# Patient Record
Sex: FEMALE | Race: WHITE
[De-identification: names, ages, dates, MRNs, and addresses within clinical notes are randomized per-mention and may not be internally consistent; named-entity substitution may affect disease eponyms.]

---

## 2018-11-10 ENCOUNTER — HOSPITAL ENCOUNTER (EMERGENCY)
Dept: HOSPITAL 62 - ER | Age: 33
Discharge: HOME | End: 2018-11-10
Payer: COMMERCIAL

## 2018-11-10 VITALS — DIASTOLIC BLOOD PRESSURE: 81 MMHG | SYSTOLIC BLOOD PRESSURE: 113 MMHG

## 2018-11-10 DIAGNOSIS — Z88.6: ICD-10-CM

## 2018-11-10 DIAGNOSIS — N85.00: Primary | ICD-10-CM

## 2018-11-10 DIAGNOSIS — R10.2: ICD-10-CM

## 2018-11-10 DIAGNOSIS — R10.30: ICD-10-CM

## 2018-11-10 DIAGNOSIS — Z97.5: ICD-10-CM

## 2018-11-10 DIAGNOSIS — Z88.0: ICD-10-CM

## 2018-11-10 DIAGNOSIS — N39.0: ICD-10-CM

## 2018-11-10 LAB
ADD MANUAL DIFF: NO
ALBUMIN SERPL-MCNC: 4.6 G/DL (ref 3.5–5)
ALP SERPL-CCNC: 62 U/L (ref 38–126)
ALT SERPL-CCNC: 38 U/L (ref 9–52)
ANION GAP SERPL CALC-SCNC: 14 MMOL/L (ref 5–19)
APPEARANCE UR: (no result)
APTT PPP: (no result) S
AST SERPL-CCNC: 29 U/L (ref 14–36)
BASOPHILS # BLD AUTO: 0 10^3/UL (ref 0–0.2)
BASOPHILS NFR BLD AUTO: 0.4 % (ref 0–2)
BILIRUB DIRECT SERPL-MCNC: 0.3 MG/DL (ref 0–0.4)
BILIRUB SERPL-MCNC: 0.5 MG/DL (ref 0.2–1.3)
BILIRUB UR QL STRIP: NEGATIVE
BUN SERPL-MCNC: 11 MG/DL (ref 7–20)
CALCIUM: 9.8 MG/DL (ref 8.4–10.2)
CHLAM PCR: NOT DETECTED
CHLORIDE SERPL-SCNC: 103 MMOL/L (ref 98–107)
CO2 SERPL-SCNC: 24 MMOL/L (ref 22–30)
EOSINOPHIL # BLD AUTO: 0.3 10^3/UL (ref 0–0.6)
EOSINOPHIL NFR BLD AUTO: 2.9 % (ref 0–6)
ERYTHROCYTE [DISTWIDTH] IN BLOOD BY AUTOMATED COUNT: 14.4 % (ref 11.5–14)
GLUCOSE SERPL-MCNC: 98 MG/DL (ref 75–110)
GLUCOSE UR STRIP-MCNC: NEGATIVE MG/DL
GON PCR: NOT DETECTED
HCT VFR BLD CALC: 39.4 % (ref 36–47)
HGB BLD-MCNC: 13.3 G/DL (ref 12–15.5)
KETONES UR STRIP-MCNC: NEGATIVE MG/DL
LIPASE SERPL-CCNC: 93.1 U/L (ref 23–300)
LYMPHOCYTES # BLD AUTO: 2.8 10^3/UL (ref 0.5–4.7)
LYMPHOCYTES NFR BLD AUTO: 27.2 % (ref 13–45)
MCH RBC QN AUTO: 27.9 PG (ref 27–33.4)
MCHC RBC AUTO-ENTMCNC: 33.7 G/DL (ref 32–36)
MCV RBC AUTO: 83 FL (ref 80–97)
MONOCYTES # BLD AUTO: 0.8 10^3/UL (ref 0.1–1.4)
MONOCYTES NFR BLD AUTO: 8.4 % (ref 3–13)
NEUTROPHILS # BLD AUTO: 6.2 10^3/UL (ref 1.7–8.2)
NEUTS SEG NFR BLD AUTO: 61.1 % (ref 42–78)
NITRITE UR QL STRIP: NEGATIVE
PH UR STRIP: 5 [PH] (ref 5–9)
PLATELET # BLD: 301 10^3/UL (ref 150–450)
POTASSIUM SERPL-SCNC: 4.8 MMOL/L (ref 3.6–5)
PROT SERPL-MCNC: 7.8 G/DL (ref 6.3–8.2)
PROT UR STRIP-MCNC: 100 MG/DL
RBC # BLD AUTO: 4.76 10^6/UL (ref 3.72–5.28)
RBCS (WET MOUNT): (no result)
SODIUM SERPL-SCNC: 141 MMOL/L (ref 137–145)
SP GR UR STRIP: 1.02
T.VAGINALIS (WET MOUNT): (no result)
TOTAL CELLS COUNTED % (AUTO): 100 %
UROBILINOGEN UR-MCNC: NEGATIVE MG/DL (ref ?–2)
WBC # BLD AUTO: 10.1 10^3/UL (ref 4–10.5)
WBCS (WET MOUNT): (no result)
YEAST (WET MOUNT): (no result)

## 2018-11-10 PROCEDURE — 83690 ASSAY OF LIPASE: CPT

## 2018-11-10 PROCEDURE — 81001 URINALYSIS AUTO W/SCOPE: CPT

## 2018-11-10 PROCEDURE — 93976 VASCULAR STUDY: CPT

## 2018-11-10 PROCEDURE — 87491 CHLMYD TRACH DNA AMP PROBE: CPT

## 2018-11-10 PROCEDURE — 87086 URINE CULTURE/COLONY COUNT: CPT

## 2018-11-10 PROCEDURE — 36415 COLL VENOUS BLD VENIPUNCTURE: CPT

## 2018-11-10 PROCEDURE — 87591 N.GONORRHOEAE DNA AMP PROB: CPT

## 2018-11-10 PROCEDURE — 76380 CAT SCAN FOLLOW-UP STUDY: CPT

## 2018-11-10 PROCEDURE — 87210 SMEAR WET MOUNT SALINE/INK: CPT

## 2018-11-10 PROCEDURE — 96365 THER/PROPH/DIAG IV INF INIT: CPT

## 2018-11-10 PROCEDURE — 96375 TX/PRO/DX INJ NEW DRUG ADDON: CPT

## 2018-11-10 PROCEDURE — 76856 US EXAM PELVIC COMPLETE: CPT

## 2018-11-10 PROCEDURE — 85025 COMPLETE CBC W/AUTO DIFF WBC: CPT

## 2018-11-10 PROCEDURE — 99284 EMERGENCY DEPT VISIT MOD MDM: CPT

## 2018-11-10 PROCEDURE — 84703 CHORIONIC GONADOTROPIN ASSAY: CPT

## 2018-11-10 PROCEDURE — 80053 COMPREHEN METABOLIC PANEL: CPT

## 2018-11-10 NOTE — ER DOCUMENT REPORT
ED GI/





- General


Chief Complaint: Abdominal Cramping


Stated Complaint: ABDOMINAL PAIN/VAGINAL BLEEDING


Time Seen by Provider: 11/10/18 10:40


Notes: 





Patient is complaining of pain in the lower abdomen/pelvic region that began 

about 5 AM this morning.  She feels as if it is "razor blades" sticking in her 

lower abdomen.  Patient is being seen by a local GYN for vaginal bleeding that 

has been heavy ever since she had her son 2 years ago.  Beginning on 

, of this year, patient says that her bleeding has become daily, every day.  

She has had an office ultrasound in mid October, which showed a left ovarian 

cyst and "hyperplasia and thickened endometrium".  She has had Pap smears and 

colposcopy performed and is scheduled for D&C .  She will have an 

IUD placed a couple of weeks later to provide hormone therapy.  Patient has had 

her tubes tied and had IUDs in the past.  She has had 3 C-sections, but no 

other abdominal surgeries.





Patient does have anxiety for which she takes Xanax as needed, but has not 

taken any recently.








TRAVEL OUTSIDE OF THE [place] IN LAST 30 DAYS: No





- Related Data


Allergies/Adverse Reactions: 


 





acetaminophen [From Percocet] Allergy (Verified 11/10/18 10:56)


 


butalbital [From Fioricet] Allergy (Verified 11/10/18 10:56)


 


caffeine [From Fioricet] Allergy (Verified 11/10/18 10:56)


 


nortriptyline [Nortriptyline] Allergy (Verified 11/10/18 10:56)


 


oxycodone HCl [From Percocet] Allergy (Verified 11/10/18 10:56)


 


Penicillins Allergy (Verified 11/10/18 10:56)


 











Past Medical History





- Social History


Smoking Status: Unknown if Ever Smoked


Family History: Reviewed & Not Pertinent


Endocrine Medical History: Reports: Hx Diabetes Mellitus Type 2 - gestational


Past Surgical History: Reports: Hx Breast Surgery - reduction, Hx  

Section





- Immunizations


Hx Diphtheria, Pertussis, Tetanus Vaccination: Yes - received with [facility]





Review of Systems





- Review of Systems


Notes: 





REVIEW OF SYSTEMS:





CONSTITUTIONAL :  Denies fever.


  


EENT:   Denies eye, ear, nose or mouth or throat pain or other symptoms.





CARDIOVASCULAR:  Denies chest pain.





RESPIRATORY:  Denies cough, chest congestion, or shortness of breath.





GASTROINTESTINAL: See HPI.





GENITOURINARY:  Denies difficulty or painful urinating, urinary frequency, 

blood in urine.





MUSCULOSKELETAL:  Denies back or neck pain.  Denies joint pain or swelling.





SKIN:   Denies rash or skin lesions.





NEUROLOGICAL:  Denies LOC or altered mental status.  Denies headache.  Denies 

sensory loss or motor deficits.





ALL OTHER SYSTEMS REVIEWED AND NEGATIVE.





Physical Exam





- Vital signs


Vitals: 


 











Temp Pulse Resp BP Pulse Ox


 


 98.2 F   85   20   151/88 H  97 


 


 11/10/18 10:31  11/10/18 10:31  11/10/18 10:31  11/10/18 10:31  11/10/18 10:31











Interpretation: Normal





- Notes


Notes: 





PHYSICAL EXAMINATION:





GENERAL: Well-appearing, in no acute distress.





HEAD: Atraumatic, normocephalic.





EYES: Pupils equal round and reactive to light, extraocular movements intact.





NECK: Normal range of motion, supple.





LUNGS: Breath sounds clear and equal bilaterally.





HEART: Regular rate and rhythm without murmurs.





ABDOMEN: Soft, mildly tender in the suprapubic region.  No guarding or rebound.

  No masses.





BACK: Mild paralumbar tenderness in the lower back, but otherwise no tenderness 

throughout remainder of the back.





EXTREMITIES: Normal range of motion without pain.





NEUROLOGICAL:  Normal speech, normal gait.  Normal sensory, motor, and reflex 

exams.  Awake, alert, and oriented x3.  Cranial nerves normal.





PSYCH: Normal mood, normal affect.





SKIN: Warm, dry, no rashes.





- Genitourinary


External exam: Normal


Speculum exam: Cervix closed.  No: Vaginal discharge


Vaginal bleeding: Mild - No clots seen


Bimanuel exam: Uterus enlarged - Somewhat enlarged and very tender to press..  

No: Cervical motion tender, Bladder/Urethral tender, Adnexal mass, Adnexal 

tenderness





Course





- Re-evaluation


Re-evalutation: 





11/10/18 11:47


Patient says she got a "little bit" of pain relief with the Toradol.  We will 

give her some fentanyl IV.  Have ordered an ultrasound.





11/10/18 16:24


Patient requested additional pain medication and was given a milligram of 

Dilaudid IV.  That seemed to help her pain significantly.  Lab studies all came 

back essentially normal except for her urinalysis.  White count and 

differential normal.  I initially did an ultrasound which did not show any 

significant finding except for endometrial hyperplasia and collection of 

heterogeneous material in the uterus.  Patient's urine looked like a possible 

UTI with many WBCs and RBCs on the urinalysis.  The blood could be secondary to 

the patient's continuous vaginal bleeding that she has had for the last several 

months.  However she had a significant number of white cells.  A urine culture 

was ordered, the patient was given Rocephin 1 g IV, and she will be discharged 

on a 6-day prescription for Macrobid.





I spoke with Dr. Walden, on-call for OB/GYN, asked him if there is anything else 

that he would recommend me doing at this time and he had no other 

recommendations and felt the patient had been sufficiently worked up and could 

be discharged home.





Patient was advised to return if she has any new or worsening symptoms.








- Vital Signs


Vital signs: 


 











Temp Pulse Resp BP Pulse Ox


 


 98.2 F   85   20   151/88 H  97 


 


 11/10/18 10:31  11/10/18 10:31  11/10/18 10:31  11/10/18 10:31  11/10/18 10:31














- Laboratory


Result Diagrams: 


 11/10/18 11:05





 11/10/18 11:05


Laboratory results interpreted by me: 


 











  11/10/18 11/10/18





  10:30 11:05


 


RDW   14.4 H


 


Urine Protein  100 H 


 


Urine Blood  LARGE H 














- Diagnostic Test


Radiology reviewed: Image reviewed, Reports reviewed - Ultrasound shows 

collection of heterogeneous material in the uterus.  CT scan showed the same.  

Otherwise, both of those studies were unremarkable.  There is no evidence of 

any kidney stone or hydronephrosis.  CT scan noncontrasted showed the same 

heterogeneous collection of material in the uterus but no other findings.





Discharge





- Discharge


Clinical Impression: 


 Pelvic pain, Abdominal pain, Endometrial hyperplasia, UTI (urinary tract 

infection)





Condition: Stable


Disposition: HOME, SELF-CARE


Additional Instructions: 


ABDOMINAL PAIN:





     There are many causes of abdominal pain.  Pain can mean a serious problem 

requiring surgery (such as appendicitis). It can also be an innocent problem 

that goes away on its own (such as a viral infection).  Often, time must pass 

to determine the cause of pain.


     The physician does not feel that hospitalization is necessary, at present. 

Things may change within the next 24 hours. Call the doctor or come back for re-

examination if any problems occur, such as:


     (1) Pain that becomes more severe, steady, or becomes concentrated in one 

specific area.  Also, pain that is more severe with movement or coughing.  


     (2) Vomiting that persists or becomes more frequent.  


     (3) Blood in the vomitus, urine, or bowel movements.  Blood in the stool 

may have a tarry or black appearance.


     (4) Shaking chills or fever greater than 100 degrees F. 


     (5) The abdomen becomes more distended or swollen. 


     (6) Bowel movements cease. 


     (7) Failure to improve as expected.








You have been found to have fluid and other materials in the your uterus that 

is likely the cause of your pain.  That is also the reason that you are 

scheduled to have the D&C done Wednesday.





TORADOL INJECTION:


     You have been given an injection of ketorolac tromethamine (Toradol).  

This is an excellent, safe drug for pain control.  It also has potent 

antiinflammatory action.  You should have significant pain relief within about 

one hour.


     Toradol is not addicting and is non-sedating.  It does not interfere with 

driving or work.


     Call or return if you develop itching, hives, shortness of breath, or rash.





PAIN MEDICATION INJECTION:


     You have received an injection of a pain medication.  You should 

experience significant pain relief within 45 minutes.  This drug is a narcotic -

- it will impair your judgement, slow your reaction time and make you sleepy (

as well as relieve your pain).  Narcotics also can cause nausea.


     You should not drive, work with machinery, or perform any task requiring 

mental alertness until all effects of the medication are gone -- six to eight 

hours.  Do not take any alcohol, or sedatives, and do not take any other 

medication without checking with your physician.








ORAL NARCOTIC MEDICATION:


     You have been given a prescription for pain control.  This medication is a 

narcotic.  It's best taken with food, as nausea can result if taken on an empty 

stomach.


     Don't operate machinery or drive within six hours of taking this 

medication.  Do not combine this medicine with alcohol, or with any medication 

which can cause sedation (such as cold tablets or sleeping pills) unless you 

get permission from the physician.


     Narcotics tend to cause constipation.  If possible, drink plenty of fluids 

and eat a diet high in fiber and fruits.








Likely URINARY TRACT INFECTION:


     Your evaluation indicates that you may have a urinary tract infection. 

This is due to germs growing in the bladder.  This is a common problem.


     This infection usually responds quickly to antibiotics.  Your antibiotic 

should be taken exactly as prescribed.  Drink plenty of fluids -- three to four 

quarts a day.


     Occasionally, a bladder anesthetic will be prescribed to help stop the 

feeling of urgency until the antibiotic has a chance to clear the infection.  

This may cause your urine to be dark orange.


     Certain urine infections require a culture.  If the doctor obtained a 

culture, the results will be back in two days.  You should call to see if a 

change in treatment is needed.


     A repeat urinalysis after you finish treatment is often recommended.  The 

physician will let you know if further testing is required.


     Call the doctor if you develop fever, chills, flank pain, inability to 

urinate, or blood in the urine.








ANTIBIOTIC THERAPY:


     You have been given an antibiotic prescription.  It's important that you 

take all the medication, unless instructed otherwise by your physician.  

Failure to complete the entire course can result in relapse of your condition.


     Common side effects of antibiotics include nausea, intestinal cramping, or 

diarrhea.  Women may develop vaginal yeast infections, and babies can get yeast 

(thrush) in the mouth following the use of antibiotics.  Contact your physician 

if you develop significant side effects from this medication.


     Allergy to this antibiotic can result in hives, wheezing, faintness, or 

itching.  If symptoms of allergy occur, stop the medication and call the doctor.











NITROFURANTOIN (MACRODANTIN, MACROBID):


     You have received a prescription for nitrofurantoin (Macrodantin). This 

antibiotic is used for urinary tract infections.





Women who are pregnant or nursing should notify the physician before taking 

this medicine.  If you have ever had a problem caused by this medication in the 

past, be sure the physician is aware of it.


     Common side effects of this medicine include nausea, vomiting, or 

decreased appetite.  Notify your physician if these side effects become severe.


     Immediately stop this medicine and call the physician if you develop cough

, shortness of breath, chest pain, weakness, jaundice (yellow color of the skin 

and whites of the eyes), or a skin rash.











FOLLOW-UP CARE:


If you have been referred to a physician for follow-up care, call the physician

s office for an appointment as you were instructed or within the next two days.

  If you experience worsening or a significant change in your symptoms, notify 

the physician immediately or return to the Emergency Department at any time for 

re-evaluation.








Keep your appointments this week to have your preop evaluation and then surgery 

as scheduled.





Return at any time if you having new or worsening symptoms such as fevers, 

increasing pain, etc.








Prescriptions: 


Hydrocodone/Acetaminophen [Norco 5-325 Tablet] 1 - 2 each PO Q6HP PRN #15 tablet


 PRN Reason: 


Nitrofurantoin/Nitrofuran Mac [Macrobid 100 mg Capsule] 1 tab PO BID #12 capsule


Referrals: 


MONTEZ VASQUEZ MD [ACTIVE STAFF] - Follow up as needed

## 2018-11-10 NOTE — RADIOLOGY REPORT (SQ)
EXAM DESCRIPTION:  CT LTD RENAL STONE PROTOCOL ON



COMPLETED DATE/TIME:  11/10/2018 2:01 pm



REASON FOR STUDY:  Suprapubic pain and left side pain and hematuria



COMPARISON:  Concurrent pelvic ultrasound



TECHNIQUE:  CT scan of the abdomen and pelvis performed without intravenous or oral contrast. Images 
reviewed with lung, soft tissue, and bone windows. Reconstructed coronal and sagittal MPR images revi
ewed. All images stored on PACS.

All CT scanners at this facility use dose modulation, iterative reconstruction, and/or weight based d
osing when appropriate to reduce radiation dose to as low as reasonably achievable (ALARA).

[facility]: Dose Right  [facility]: CareDose    [facility]: Dose Right    [facility]: Teradose 4D    [facility]: Smart Cognitive Code



RADIATION DOSE:  CT Rad equipment meets quality standard of care and radiation dose reduction techniq
ues were employed. CTDIvol: 11.1 mGy. DLP: 612 mGy-cm.mGy.



LIMITATIONS:  None.



FINDINGS:  LOWER CHEST: No significant findings. No nodules or infiltrates.

NON-CONTRASTED LIVER, SPLEEN, ADRENALS: Evaluation limited by lack of IV contrast.  The liver is diff
usely hypoattenuating.  No identified significant masses.

PANCREAS: No masses. No peripancreatic inflammatory changes.

GALLBLADDER: No identified stones by CT criteria. No inflammatory changes to suggest cholecystitis.

RIGHT KIDNEY AND URETER: No suspicious masses. Assessment limited by lack of IV contrast.   No signif
icant calcifications.   No hydronephrosis or hydroureter.

LEFT KIDNEY AND URETER: No suspicious masses. Assessment limited by lack of IV contrast.   No signifi
cant calcifications.   No hydronephrosis or hydroureter.

AORTA AND RETROPERITONEUM: No aneurysm. No retroperitoneal masses or adenopathy.

BOWEL AND PERITONEAL CAVITY: No dilated loops of bowel.  No obvious masses or inflammatory changes. N
o free fluid.  No intraperitoneal free air.

APPENDIX: Normal.

PELVIS, BLADDER, AND ABDOMINAL WALL:Endometrial cavity is distended with heterogeneous hyperattenuati
ng an hypoattenuating material, corresponding to same day pelvic ultrasound.  Bilateral adnexal hypod
ensities, located anteriorly within the pelvis measuring 2.6 x 3.6 cm on the left and 4.4 x 3.4 cm on
 the right.  These likely represent ovarian cysts.  Given the age of the patient and the size, no fur
ther follow-up is warranted.  No pelvic free fluid.  Urinary bladder is collapsed.

BONES: Mild degenerative disc disease of L4-S1.

OTHER: No other significant finding.



IMPRESSION:

1. No acute findings within the abdomen or pelvis.  Specifically, no nephrolithiasis or hydronephrosi
s.

2. Endometrial cavity the uterus is distended with heterogenous material.  Again, likely representing
 blood products, given the patient's age, and corresponding to same day pelvic ultrasound.  Recommend
ations remain the same.



COMMENT:  Quality ID # 436: Final reports with documentation of one or more dose reduction techniques
 (e.g., Automated exposure control, adjustment of the mA and/or kV according to patient size, use of 
iterative reconstruction technique)



TECHNICAL DOCUMENTATION:  JOB ID:  [id number]

 2011 [employer]- All Rights Reserved



Reading location - IP/workstation name: [email or web address]

## 2018-11-10 NOTE — RADIOLOGY REPORT (SQ)
EXAM DESCRIPTION:  U/S NON OB PEL W/DOPPLER



COMPLETED DATE/TIME:  11/10/2018 12:56 pm



REASON FOR STUDY:  Lower midline abdominal pain, Hx of ovarian cyst



COMPARISON:  2/20/2008



TECHNIQUE:  Dynamic and static grayscale images acquired of the pelvis via transabdominal approach an
d recorded on PACS. Additional selected color Doppler and spectral images recorded.



LIMITATIONS:  None.



FINDINGS:  UTERUS: Contour normal.  No mass.

ENDOMETRIAL STRIPE: Heterogenous

CERVIX: Few nabothian cysts.  Heterogenous material within the cervical canal.

RIGHT OVARY AND DOPPLER: Ovary not visualized.

LEFT OVARY AND DOPPLER: Ovary not visualized.

FREE FLUID: None noted.

OTHER: No other significant finding.

MEASUREMENTS:

UTERUS: 11.9 x 5.1 x 6.4 cm

ENDOMETRIAL STRIPE: 2.2 cm

RIGHT OVARY: Not visualized.

LEFT OVARY: Not visualized



IMPRESSION:

1. Nonvisualization of the ovaries.

2. Heterogenous material within the endometrium extending into the cervical canal which likely repres
ents blood products giving the patient's age.  However, other etiologies remain in the differential. 
 Correlate with physical exam and tissue sampling.



TECHNICAL DOCUMENTATION:  JOB ID:  [id number]

 2011 [employer]- All Rights Reserved                           Rev-5/18



Reading location - IP/workstation name: [email or web address]

## 2018-11-13 LAB
APPEARANCE UR: (no result)
APTT PPP: YELLOW S
BILIRUB UR QL STRIP: NEGATIVE
ERYTHROCYTE [DISTWIDTH] IN BLOOD BY AUTOMATED COUNT: 14.2 % (ref 11.5–14)
GLUCOSE UR STRIP-MCNC: NEGATIVE MG/DL
HCT VFR BLD CALC: 35.9 % (ref 36–47)
HGB BLD-MCNC: 12.3 G/DL (ref 12–15.5)
KETONES UR STRIP-MCNC: NEGATIVE MG/DL
MCH RBC QN AUTO: 28.1 PG (ref 27–33.4)
MCHC RBC AUTO-ENTMCNC: 34.2 G/DL (ref 32–36)
MCV RBC AUTO: 82 FL (ref 80–97)
NITRITE UR QL STRIP: NEGATIVE
PH UR STRIP: 5 [PH] (ref 5–9)
PLATELET # BLD: 255 10^3/UL (ref 150–450)
PROT UR STRIP-MCNC: 30 MG/DL
RBC # BLD AUTO: 4.37 10^6/UL (ref 3.72–5.28)
SP GR UR STRIP: 1.02
UROBILINOGEN UR-MCNC: 2 MG/DL (ref ?–2)
WBC # BLD AUTO: 6.7 10^3/UL (ref 4–10.5)

## 2018-11-14 ENCOUNTER — HOSPITAL ENCOUNTER (OUTPATIENT)
Dept: HOSPITAL 62 - OROUT | Age: 33
Discharge: HOME | End: 2018-11-14
Attending: OBSTETRICS & GYNECOLOGY
Payer: COMMERCIAL

## 2018-11-14 VITALS — SYSTOLIC BLOOD PRESSURE: 113 MMHG | DIASTOLIC BLOOD PRESSURE: 70 MMHG

## 2018-11-14 DIAGNOSIS — Z79.899: ICD-10-CM

## 2018-11-14 DIAGNOSIS — D06.9: Primary | ICD-10-CM

## 2018-11-14 DIAGNOSIS — N93.8: ICD-10-CM

## 2018-11-14 DIAGNOSIS — Z88.0: ICD-10-CM

## 2018-11-14 DIAGNOSIS — N84.0: ICD-10-CM

## 2018-11-14 DIAGNOSIS — Z88.8: ICD-10-CM

## 2018-11-14 DIAGNOSIS — Z88.5: ICD-10-CM

## 2018-11-14 PROCEDURE — 36415 COLL VENOUS BLD VENIPUNCTURE: CPT

## 2018-11-14 PROCEDURE — 81025 URINE PREGNANCY TEST: CPT

## 2018-11-14 PROCEDURE — 85027 COMPLETE CBC AUTOMATED: CPT

## 2018-11-14 PROCEDURE — 58558 HYSTEROSCOPY BIOPSY: CPT

## 2018-11-14 PROCEDURE — 57520 CONIZATION OF CERVIX: CPT

## 2018-11-14 PROCEDURE — 88305 TISSUE EXAM BY PATHOLOGIST: CPT

## 2018-11-14 PROCEDURE — 88307 TISSUE EXAM BY PATHOLOGIST: CPT

## 2018-11-14 PROCEDURE — 81001 URINALYSIS AUTO W/SCOPE: CPT

## 2018-11-14 NOTE — OPERATIVE REPORT
Operative Report


DATE OF SURGERY: 18


PREOPERATIVE DIAGNOSIS: Dysfunctional Uterine Bleeding, SImple hyperplasia 

without atypia, TREY II/LSGIL persistent


POSTOPERATIVE DIAGNOSIS: LALY


OPERATION: EUA, Paracervical Block, Hysteroscopy, D&C, CKC with ECC


SURGEON: DOM PORTILLO


ANESTHESIA: LMAC


TISSUE REMOVED OR ALTERED: EMC, ECC (after CKC), CKC with tag at 12 o'clock.


COMPLICATIONS: 


none


ESTIMATED BLOOD LOSS: 5ml


INTRAOPERATIVE FINDINGS: 8wks AV uterus with moderate descent for vaginal 

hysterectomy if needed in future, on hysteroscope endometrium was hyperemic an 

very thickened on posterior uterine wall.  Decreased uptake at 7-8 o'clock and 

3 o'clock on cervix after application of silver nitrate.


PROCEDURE: 


Anesthesia: [BRISEIDA Luevano CRNA, MD]





IVF: [800ml]





UOP: 100ml





Indications: [34yo  presents for surgical evaluation and treatment of 

simple hyperplasia without atypia and with persistent TREY II/LGSIL.  She was 

counseled on the risks, benefits, alternatives and desires to proceed with 

planned procedure.]





Procedure: The patient was taken to the Operating Room where anesthesia was 

obtained without difficulty.  She was prepped and draped in the normal sterile 

fashion in the dorsal lithotomy position.  Exam under anesthesia was performed 

and noted above.  A speculum was placed in the vagina.  The anterior cervix was 

grasped with a single-tooth tenaculum and the uterus sounded to 8 cm after 

paracervical block was performed with 8 mL of 1% lidocaine with epinephrine.  

Sequential dilators were then used to dilate the cervix to accommodate the 

hysteroscope.  The hysteroscope was then gently advanced into the uterine 

cavity in the usual fashion with visualization of the thickened hyperemic 

endometrium as noted above.  The hysteroscope was then removed and then gentle 

curettage was performed until a gritty texture was noted.  Suture was placed at 

3 o'clock and 9 o'clock and lugol's solution applied to vagina and cervix with 

findings as noted above.  Cold Knife Conization was performed in the usual 

fashion.  Endocervical currettage performed above the conization.  The bed of 

the CKC site was cauterized and monsels soaked foam placed in bed of CKC.  All 

instruments were removed from the patient's cervix and vagina.  Silver nitrate 

was applied to the tenaculum site for hemostasis.


Sponge lap needle and instrument counts are correct 2.  No perioperative 

antibiotics were given as is not indicated for this procedure.  The patient 

tolerated the procedure well and was taken to the recovery area awake and in 

stable condition.

## 2019-02-14 LAB
ALBUMIN SERPL-MCNC: 4.4 G/DL (ref 3.5–5)
ALP SERPL-CCNC: 54 U/L (ref 38–126)
ALT SERPL-CCNC: 44 U/L (ref 9–52)
ANION GAP SERPL CALC-SCNC: 9 MMOL/L (ref 5–19)
APPEARANCE UR: (no result)
APTT PPP: YELLOW S
AST SERPL-CCNC: 29 U/L (ref 14–36)
BILIRUB DIRECT SERPL-MCNC: 0.2 MG/DL (ref 0–0.4)
BILIRUB SERPL-MCNC: 0.3 MG/DL (ref 0.2–1.3)
BILIRUB UR QL STRIP: NEGATIVE
BUN SERPL-MCNC: 12 MG/DL (ref 7–20)
CALCIUM: 9.4 MG/DL (ref 8.4–10.2)
CHLORIDE SERPL-SCNC: 109 MMOL/L (ref 98–107)
CO2 SERPL-SCNC: 24 MMOL/L (ref 22–30)
ERYTHROCYTE [DISTWIDTH] IN BLOOD BY AUTOMATED COUNT: 14.1 % (ref 11.5–14)
GLUCOSE SERPL-MCNC: 121 MG/DL (ref 75–110)
GLUCOSE UR STRIP-MCNC: NEGATIVE MG/DL
HCT VFR BLD CALC: 36.3 % (ref 36–47)
HGB BLD-MCNC: 12.2 G/DL (ref 12–15.5)
KETONES UR STRIP-MCNC: NEGATIVE MG/DL
MCH RBC QN AUTO: 27.5 PG (ref 27–33.4)
MCHC RBC AUTO-ENTMCNC: 33.7 G/DL (ref 32–36)
MCV RBC AUTO: 82 FL (ref 80–97)
NITRITE UR QL STRIP: NEGATIVE
PH UR STRIP: 5 [PH] (ref 5–9)
PLATELET # BLD: 299 10^3/UL (ref 150–450)
POTASSIUM SERPL-SCNC: 4.6 MMOL/L (ref 3.6–5)
PROT SERPL-MCNC: 6.9 G/DL (ref 6.3–8.2)
PROT UR STRIP-MCNC: NEGATIVE MG/DL
RBC # BLD AUTO: 4.44 10^6/UL (ref 3.72–5.28)
SODIUM SERPL-SCNC: 142.3 MMOL/L (ref 137–145)
SP GR UR STRIP: 1.02
UROBILINOGEN UR-MCNC: NEGATIVE MG/DL (ref ?–2)
WBC # BLD AUTO: 8.8 10^3/UL (ref 4–10.5)

## 2019-02-20 ENCOUNTER — HOSPITAL ENCOUNTER (OUTPATIENT)
Dept: HOSPITAL 62 - OROUT | Age: 34
LOS: 1 days | Discharge: HOME | End: 2019-02-21
Attending: OBSTETRICS & GYNECOLOGY
Payer: COMMERCIAL

## 2019-02-20 DIAGNOSIS — N87.0: ICD-10-CM

## 2019-02-20 DIAGNOSIS — Z88.5: ICD-10-CM

## 2019-02-20 DIAGNOSIS — Z88.0: ICD-10-CM

## 2019-02-20 DIAGNOSIS — Z13.0: ICD-10-CM

## 2019-02-20 DIAGNOSIS — Z01.818: ICD-10-CM

## 2019-02-20 DIAGNOSIS — N92.1: Primary | ICD-10-CM

## 2019-02-20 DIAGNOSIS — N94.6: ICD-10-CM

## 2019-02-20 PROCEDURE — 81001 URINALYSIS AUTO W/SCOPE: CPT

## 2019-02-20 PROCEDURE — 36415 COLL VENOUS BLD VENIPUNCTURE: CPT

## 2019-02-20 PROCEDURE — S0119 ONDANSETRON 4 MG: HCPCS

## 2019-02-20 PROCEDURE — 85027 COMPLETE CBC AUTOMATED: CPT

## 2019-02-20 PROCEDURE — 86901 BLOOD TYPING SEROLOGIC RH(D): CPT

## 2019-02-20 PROCEDURE — 88307 TISSUE EXAM BY PATHOLOGIST: CPT

## 2019-02-20 PROCEDURE — 80053 COMPREHEN METABOLIC PANEL: CPT

## 2019-02-20 PROCEDURE — 86850 RBC ANTIBODY SCREEN: CPT

## 2019-02-20 PROCEDURE — 86900 BLOOD TYPING SEROLOGIC ABO: CPT

## 2019-02-20 PROCEDURE — 58260 VAGINAL HYSTERECTOMY: CPT

## 2019-02-20 PROCEDURE — 81025 URINE PREGNANCY TEST: CPT

## 2019-02-20 RX ADMIN — ONDANSETRON HYDROCHLORIDE SCH MG: 8 TABLET, FILM COATED ORAL at 17:41

## 2019-02-20 RX ADMIN — IBUPROFEN SCH MG: 800 TABLET, FILM COATED ORAL at 14:08

## 2019-02-20 RX ADMIN — FENTANYL CITRATE ONE MCG: 50 INJECTION INTRAMUSCULAR; INTRAVENOUS at 11:35

## 2019-02-20 RX ADMIN — HYDROMORPHONE HYDROCHLORIDE PRN MG: 2 TABLET ORAL at 15:58

## 2019-02-20 RX ADMIN — ONDANSETRON HYDROCHLORIDE SCH: 8 TABLET, FILM COATED ORAL at 14:10

## 2019-02-20 RX ADMIN — IBUPROFEN SCH MG: 800 TABLET, FILM COATED ORAL at 23:31

## 2019-02-20 RX ADMIN — FENTANYL CITRATE ONE MCG: 50 INJECTION INTRAMUSCULAR; INTRAVENOUS at 11:26

## 2019-02-20 NOTE — OPERATIVE REPORT E
Operative Report



NAME: ISAAC ADAM

MRN:  [id number]          : 1985 AGE:  33Y

DATE OF SURGERY: 2019                       ROOM:



PREOPERATIVE DIAGNOSIS:

Dysmenorrhea and menorrhagia.



POSTOPERATIVE DIAGNOSIS:

Dysmenorrhea and menorrhagia.



OPERATION:

TVH.



SURGEON:

LIVAN RICHARD M.D.



ANESTHESIA:

General.



ESTIMATED BLOOD LOSS:

Approximately 75 mL.



TISSUE REMOVED:

Uterus.



PROCEDURE:

The patient was placed in a dorsal lithotomy position, prepped, and draped

in the usual sterile fashion.  A speculum was placed.  Cervix was

visualized and grasped with a single-tooth tenaculum.  Posterior

cul-de-sac was entered with sharp dissection.  Posterior parietal

peritoneum was sutured to the posterior cuff with 2-0 Vicryl.  Left

uterosacral was clamped, divided, and sutured with 2-0 Vicryl.  The

procedure was repeated on the right.  The cervix was sharply

circumscribed.  The anterior parietal peritoneum was entered with sharp

dissection.  Serial clamps were used on each side of the uterus, each

pedicle being clamped, divided, and sutured with 2-0 Vicryl, continued to

the level of the utero-ovarian ligaments, which were crossclamped and the

uterus removed.  Utero-ovarian ligaments were sutured with a free tie of

2-0 Vicryl followed by a suture tie of 2-0 Vicryl.  The pelvis was

inspected and hemostasis was noted.  The cuff was closed with interrupted

2-0 Vicryl.  Hemostasis was noted.  The urine remained clear throughout

the procedure.  The procedure was terminated, and she was taken to the

recovery room in good condition.



DICTATING PHYSICIAN:  LIVAN RICHARD M.D.





[id number]                  DT: 2019    [id number]

PHY#: [id number]            DD: 2019    1013

ID:   [id number]           JOB#: [id number]       ACCT: [id number]



cc:LIVAN RICHARD M.D.

>

## 2019-02-21 VITALS — DIASTOLIC BLOOD PRESSURE: 90 MMHG | SYSTOLIC BLOOD PRESSURE: 134 MMHG

## 2019-02-21 RX ADMIN — HYDROMORPHONE HYDROCHLORIDE PRN MG: 2 TABLET ORAL at 04:38

## 2019-02-21 NOTE — PDOC DISCHARGE SUMMARY
General





- Admit/Disc Date/PCP


Discharge Date: 02/21/19





- Discharge Diagnosis


(1) Dysmenorrhea


Is this a current diagnosis for this admission?: Yes   





(2) Menorrhagia


Is this a current diagnosis for this admission?: Yes   





- Additional Information


Discharge Diet: As Tolerated, Regular


Discharge Activity: Activity As Tolerated, Balance Activity w/Rest, No Lifting 

Over 10 Pounds, No Lifting/Push/Pulling, Pelvic Rest, No tub bath, Walk 

Frequently


Home Medications: 








Prenatal 48/Iron/Folic Acid/B6 [Vinacal B Prenatal Combo Pack] 1 tab PO DAILY 

01/15/16 


Diphenhydramine HCl [Benadryl 25 mg Capsule] 25 mg PO PRN PRN 02/14/19 


Oxycodone HCl/Acetaminophen [Percocet 5-325 mg Tablet] 1 tab PO QHS PRN 02/14/19













History of Present Illness


Patient complains of: dysmenorrhea and menorrhagia


History of Present Illness: 


ISAAC ADAM is a 33 year old female admitted for TVH duetp dysmenorrhea and 

menorrhagia








Hospital Course


Hospital Course: 





py had TVH and benign hospital course. she is tolration a reg diet and 

ambulating without difficulty.. bowel and bladder function good





Physical Exam





- Physical Exam


Vital Signs: 


                                        











Temp Pulse Resp BP Pulse Ox


 


 98.3 F   78   18   134/90 H  98 


 


 02/21/19 07:33  02/21/19 07:33  02/21/19 07:33  02/21/19 07:33  02/21/19 07:33








                                 Intake & Output











 02/20/19 02/21/19 02/22/19





 06:59 06:59 06:59


 


Intake Total  1665 


 


Output Total  275 


 


Balance  1390 


 


Weight  90.72 kg 











General appearance: PRESENT: no acute distress


Respiratory exam: PRESENT: clear to auscultation berenice


GI/Abdominal exam: PRESENT: tenderness





Result


Laboratory Results: 


                                        





                                 02/14/19 09:33 





                                 02/14/19 09:33 











Plan


Discharge Plan: 





discharge  f/u 1 week


Time Spent: Less than 30 Minutes

## 2019-03-03 ENCOUNTER — HOSPITAL ENCOUNTER (EMERGENCY)
Dept: HOSPITAL 62 - ER | Age: 34
Discharge: HOME | End: 2019-03-03
Payer: COMMERCIAL

## 2019-03-03 VITALS — DIASTOLIC BLOOD PRESSURE: 84 MMHG | SYSTOLIC BLOOD PRESSURE: 129 MMHG

## 2019-03-03 DIAGNOSIS — Z90.710: ICD-10-CM

## 2019-03-03 DIAGNOSIS — N39.0: ICD-10-CM

## 2019-03-03 DIAGNOSIS — Z88.0: ICD-10-CM

## 2019-03-03 DIAGNOSIS — Z88.8: ICD-10-CM

## 2019-03-03 DIAGNOSIS — N93.9: Primary | ICD-10-CM

## 2019-03-03 DIAGNOSIS — Z88.5: ICD-10-CM

## 2019-03-03 LAB
ADD MANUAL DIFF: NO
ALBUMIN SERPL-MCNC: 4.6 G/DL (ref 3.5–5)
ALP SERPL-CCNC: 64 U/L (ref 38–126)
ALT SERPL-CCNC: 43 U/L (ref 9–52)
ANION GAP SERPL CALC-SCNC: 10 MMOL/L (ref 5–19)
APPEARANCE UR: (no result)
APTT PPP: YELLOW S
AST SERPL-CCNC: 25 U/L (ref 14–36)
BASOPHILS # BLD AUTO: 0 10^3/UL (ref 0–0.2)
BASOPHILS NFR BLD AUTO: 0.6 % (ref 0–2)
BILIRUB DIRECT SERPL-MCNC: 0.1 MG/DL (ref 0–0.4)
BILIRUB SERPL-MCNC: 0.3 MG/DL (ref 0.2–1.3)
BILIRUB UR QL STRIP: NEGATIVE
BUN SERPL-MCNC: 15 MG/DL (ref 7–20)
CALCIUM: 9.7 MG/DL (ref 8.4–10.2)
CHLORIDE SERPL-SCNC: 106 MMOL/L (ref 98–107)
CO2 SERPL-SCNC: 28 MMOL/L (ref 22–30)
EOSINOPHIL # BLD AUTO: 0.2 10^3/UL (ref 0–0.6)
EOSINOPHIL NFR BLD AUTO: 3.1 % (ref 0–6)
ERYTHROCYTE [DISTWIDTH] IN BLOOD BY AUTOMATED COUNT: 13.6 % (ref 11.5–14)
GLUCOSE SERPL-MCNC: 105 MG/DL (ref 75–110)
GLUCOSE UR STRIP-MCNC: NEGATIVE MG/DL
HCT VFR BLD CALC: 36.6 % (ref 36–47)
HGB BLD-MCNC: 12.3 G/DL (ref 12–15.5)
KETONES UR STRIP-MCNC: NEGATIVE MG/DL
LYMPHOCYTES # BLD AUTO: 2.4 10^3/UL (ref 0.5–4.7)
LYMPHOCYTES NFR BLD AUTO: 32.3 % (ref 13–45)
MCH RBC QN AUTO: 27.2 PG (ref 27–33.4)
MCHC RBC AUTO-ENTMCNC: 33.7 G/DL (ref 32–36)
MCV RBC AUTO: 81 FL (ref 80–97)
MONOCYTES # BLD AUTO: 0.6 10^3/UL (ref 0.1–1.4)
MONOCYTES NFR BLD AUTO: 8.4 % (ref 3–13)
NEUTROPHILS # BLD AUTO: 4.2 10^3/UL (ref 1.7–8.2)
NEUTS SEG NFR BLD AUTO: 55.6 % (ref 42–78)
NITRITE UR QL STRIP: NEGATIVE
PH UR STRIP: 8 [PH] (ref 5–9)
PLATELET # BLD: 358 10^3/UL (ref 150–450)
POTASSIUM SERPL-SCNC: 4.3 MMOL/L (ref 3.6–5)
PROT SERPL-MCNC: 7.5 G/DL (ref 6.3–8.2)
PROT UR STRIP-MCNC: 30 MG/DL
RBC # BLD AUTO: 4.53 10^6/UL (ref 3.72–5.28)
SODIUM SERPL-SCNC: 143.8 MMOL/L (ref 137–145)
SP GR UR STRIP: 1.02
TOTAL CELLS COUNTED % (AUTO): 100 %
UROBILINOGEN UR-MCNC: NEGATIVE MG/DL (ref ?–2)
WBC # BLD AUTO: 7.6 10^3/UL (ref 4–10.5)

## 2019-03-03 PROCEDURE — 36415 COLL VENOUS BLD VENIPUNCTURE: CPT

## 2019-03-03 PROCEDURE — 80053 COMPREHEN METABOLIC PANEL: CPT

## 2019-03-03 PROCEDURE — 85025 COMPLETE CBC W/AUTO DIFF WBC: CPT

## 2019-03-03 PROCEDURE — 99284 EMERGENCY DEPT VISIT MOD MDM: CPT

## 2019-03-03 PROCEDURE — 81001 URINALYSIS AUTO W/SCOPE: CPT

## 2019-03-03 NOTE — ER DOCUMENT REPORT
Addendum entered and electronically signed by MARGARITA TURNER PA-C  19 

15:38: 








Discharge





- Discharge


Clinical Impression: 


 Vaginal bleeding, History of hysterectomy





UTI (urinary tract infection)


Qualifiers:


 Urinary tract infection type: site unspecified Hematuria presence: without 

hematuria Qualified Code(s): N39.0 - Urinary tract infection, site not specified





Condition: Good


Disposition: HOME, SELF-CARE


Instructions:  Vaginal Bleeding ([facility]), Urinary Tract Infection ([facility])


Additional Instructions: 


Please return to the emergency department if you start to bleed heavily such 

that you are using at least 1 pad per hour to contain the bleeding.  Otherwise 

if you continue to just spotting, please call your gynecologist tomorrow morning

and set up an outpatient follow-up.


Prescriptions: 


Ciprofloxacin HCl [Cipro 500 mg Tablet] 500 mg PO BID #10 tablet


Referrals: 


STEVE MISTRY MD [Primary Care Provider] - Follow up as needed


MARVIN HERNANDES MD [ACTIVE STAFF] - Follow up tomorrow





Original Note:








ED General





- General


Chief Complaint: Post Surgical Bleeding


Stated Complaint: POST OP COMPLICATIONS


Time Seen by Provider: 19 12:21


Primary Care Provider: 


STEVE MISTRY MD [Primary Care Provider] - Follow up as needed


Mode of Arrival: Ambulatory


Information source: Patient


TRAVEL OUTSIDE OF THE [place] IN LAST 30 DAYS: No





- HPI


Patient complains to provider of: Vaginal bleeding status post hysterectomy


Onset: Just prior to arrival


Onset/Duration: Sudden


Quality of pain: Achy


Severity: Moderate


Pain Level: 3


Associated symptoms: denies: Chills, Fever


Exacerbated by: Denies


Relieved by: Denies


Similar symptoms previously: No


Recently seen / treated by doctor: No


Notes: 





Patient is a 33-year-old  female presenting for vaginal bleeding after 

hysterectomy.  She had a vaginal hysterectomy performed on .  She was

doing fine.  Her little son apparently jumped up on her stomach wanting to play 

with her.  This morning she was on the toilet going to the bathroom and 

experienced what she describes as a gush of red blood from her vagina.  Ever 

since then she has had just a small amount of spotting.  She is having some 

soreness which she describes as normal from the procedure.  She is not 

experienced any more gushing.  She is not dizzy, lightheaded, weak, or short of 

breath.





- Related Data


Allergies/Adverse Reactions: 


                                        





acetaminophen [From Percocet] Allergy (Verified 19 12:08)


   


nortriptyline [Nortriptyline] Allergy (Verified 19 12:08)


   


oxycodone HCl [From Percocet] Allergy (Verified 19 12:08)


   


Penicillins Allergy (Verified 19 12:08)


   


sumatriptan [From Imitrex] Adverse Reaction (Verified 19 12:08)


   











Past Medical History





- General


Information source: Patient





- Social History


Smoking Status: Never Smoker


Family History: Reviewed & Not Pertinent


Patient has suicidal ideation: No


Patient has homicidal ideation: No





- Past Medical History


Cardiac Medical History: 


   Denies: Hx Coronary Artery Disease, Hx Heart Attack, Hx Hypertension


Pulmonary Medical History: 


   Denies: Hx Asthma, Hx Bronchitis, Hx COPD, Hx Pneumonia


Neurological Medical History: Denies: Hx Cerebrovascular Accident, Hx Seizures


Endocrine Medical History: Reports: Hx Diabetes Mellitus Type 2 - gestational


Renal/ Medical History: Denies: Hx Peritoneal Dialysis


Musculoskeletal Medical History: Denies Hx Arthritis


Past Surgical History: Reports: Hx Breast Surgery - reduction, Hx  

Section, Hx Hysterectomy, Hx Tubal Ligation





- Immunizations


Hx Diphtheria, Pertussis, Tetanus Vaccination: Yes - received with [facility]





Review of Systems





- Review of Systems


Notes: 





Constitutional:  No fevers. No chills.





EENT: No eye redness. No eye pain. No ear pain. No sore throat.





Cardiovascular:  No chest pain. No palpitations.





Respiratory: No cough. No shortness of breath. No respiratory distress.





Gastrointestinal: No abdominal pain. No nausea, vomiting, or diarrhea.





Genitourinary: Positive for vaginal bleed





Musculoskeletal: Atraumatic. No swelling. No deformities.





Skin: No rash or lesions.





Lymphatic: No swollen lymph nodes.





Neurologic: No headache. No syncope.





Psychiatric: No suicidal or homicidal ideation.





Physical Exam





- Vital signs


Vitals: 





                                        











Temp Pulse Resp BP Pulse Ox


 


 98 F   85   18   147/85 H  99 


 


 19 12:10  19 12:10  19 12:10  19 12:10  19 12:10














- Notes


Notes: 





General: Well-developed, well-nourished. In no acute distress. Non-toxic 

appearing.





Cardiac: Well-perfused. Regular rate and rhythm. No murmurs, rubs, or gallops. 





Pulmonary: No respiratory distress. No cyanosis. Bilateral lung fiels are clear 

to auscultation.





Abdominal: Non-distended. Non-rigid. Bowels sounds are present in all four 

quadrants. No guarding or rebound.





HEENT: Head is atraumatic. Conjunctivae not reddened. No tearing. PERRL. EOMI. 

Orbits atraumatic. No periorbital swelling or erythema. Oropharynx is without 

erythema, swelling, or exudates.





Neck: Supple. No adenopathy. No meningismus.





Dermatologic: Warm with good turgor. No rash. Atraumatic.





Chest: Atraumatic. No chest wall tenderness to palpation.





Musculoskeletal: Moves all extremities well. No range of motion deficits. no 

muscular or joint tenderness. No paraspinal muscle tenderness. no midline spinal

tenderness or step-off.





Genitourinary: Examination deferred per patient request





Neurologic: No gross neurologic deficits.





Psychiatric: Normal mood. 











Course





- Re-evaluation


Re-evalutation: 





19 15:31


Patient is apparently just spotting now.  Her hemoglobin is well within normal 

range.  Patient prefers not to have a pelvic exam now that her bleeding has 

subsided for the most part.  She is not symptomatic of acute blood loss.  She 

will follow-up with her surgeon Dr. Marvin Hernandes in the next couple of days.  

She will return here if she starts to have intractable hemorrhaging.





- Vital Signs


Vital signs: 





                                        











Temp Pulse Resp BP Pulse Ox


 


 98 F   85   18   147/85 H  99 


 


 19 12:10  19 12:10  19 12:10  19 12:10  19 12:10














- Laboratory


Result Diagrams: 


                                 19 12:40





                                 19 12:40


Laboratory results interpreted by me: 





                                        











  19





  12:40


 


Urine Protein  30 H


 


Urine Blood  LARGE H


 


Ur Leukocyte Esterase  LARGE H














Discharge





- Discharge


Clinical Impression: 


 Vaginal bleeding, History of hysterectomy





Condition: Good


Disposition: HOME, SELF-CARE


Instructions:  Vaginal Bleeding ([facility])


Additional Instructions: 


Please return to the emergency department if you start to bleed heavily such 

that you are using at least 1 pad per hour to contain the bleeding.  Otherwise 

if you continue to just spotting, please call your gynecologist tomorrow morning

and set up an outpatient follow-up.


Referrals: 


STEVE MISTRY MD [Primary Care Provider] - Follow up as needed


MARVIN HERNANDES MD [ACTIVE STAFF] - Follow up tomorrow

## 2019-03-03 NOTE — ER DOCUMENT REPORT
ED Medical Screen (RME)





- General


Chief Complaint: Post Surgical Bleeding


Stated Complaint: POST OP COMPLICATIONS


Time Seen by Provider: 19 12:21


Mode of Arrival: Ambulatory


Information source: Patient


TRAVEL OUTSIDE OF THE [place] IN LAST 30 DAYS: No





- HPI


Patient complains to provider of: post-op bleeding


Onset: This morning - pt. had hysterectomy  and was doing well until earlier

today when she had large amount of vaginal  bleeding





- Related Data


Allergies/Adverse Reactions: 


                                        





acetaminophen [From Percocet] Allergy (Verified 19 12:08)


   


nortriptyline [Nortriptyline] Allergy (Verified 19 12:08)


   


oxycodone HCl [From Percocet] Allergy (Verified 19 12:08)


   


Penicillins Allergy (Verified 19 12:08)


   


sumatriptan [From Imitrex] Adverse Reaction (Verified 19 12:08)


   











Past Medical History





- Past Medical History


Cardiac Medical History: 


   Denies: Hx Coronary Artery Disease, Hx Heart Attack, Hx Hypertension


Pulmonary Medical History: 


   Denies: Hx Asthma, Hx Bronchitis, Hx COPD, Hx Pneumonia


Neurological Medical History: Denies: Hx Cerebrovascular Accident, Hx Seizures


Endocrine Medical History: Reports: Hx Diabetes Mellitus Type 2 - gestational


Renal/ Medical History: Denies: Hx Peritoneal Dialysis


Musculoskeltal Medical History: Denies Hx Arthritis


Past Surgical History: Reports: Hx Breast Surgery - reduction, Hx  

Section, Hx Tubal Ligation





- Immunizations


Hx Diphtheria, Pertussis, Tetanus Vaccination: Yes - received with [facility]


History of Influenza Vaccine for 10/2017 - 3/2018 Season: Yes


Influenza Administration Date for 10/2017 - 3/2018 Season: 10/01/18





Physical Exam





- Vital signs


Vitals: 





                                        











Temp Pulse Resp BP Pulse Ox


 


 98 F   85   18   147/85 H  99 


 


 19 12:10  19 12:10  19 12:10  19 12:10  19 12:10














Course





- Vital Signs


Vital signs: 





                                        











Temp Pulse Resp BP Pulse Ox


 


 98 F   85   18   147/85 H  99 


 


 19 12:10  19 12:10  19 12:10  19 12:10  19 12:10